# Patient Record
Sex: FEMALE | Race: WHITE
[De-identification: names, ages, dates, MRNs, and addresses within clinical notes are randomized per-mention and may not be internally consistent; named-entity substitution may affect disease eponyms.]

---

## 2019-04-18 ENCOUNTER — HOSPITAL ENCOUNTER (OUTPATIENT)
Dept: HOSPITAL 76 - LAB | Age: 29
Discharge: HOME | End: 2019-04-18
Attending: ADVANCED PRACTICE MIDWIFE
Payer: SELF-PAY

## 2019-04-18 DIAGNOSIS — O20.0: Primary | ICD-10-CM

## 2019-04-18 LAB
ERYTHROCYTE [DISTWIDTH] IN BLOOD BY AUTOMATED COUNT: 14 % (ref 12–15)
HGB UR QL STRIP: 14.3 G/DL (ref 12–16)
MCH RBC QN AUTO: 30.4 PG (ref 27–31)
MCHC RBC AUTO-ENTMCNC: 33.1 G/DL (ref 32–36)
MCV RBC AUTO: 91.8 FL (ref 81–99)
NEUTROPHILS # SNV AUTO: 6.5 X10^3/UL (ref 4.8–10.8)
PDW BLD AUTO: 8.1 FL (ref 7.9–10.8)
PLATELET # BLD: 307 10^3/UL (ref 130–450)
RBC MAR: 4.7 10^6/UL (ref 4.2–5.4)

## 2019-04-18 PROCEDURE — 86900 BLOOD TYPING SEROLOGIC ABO: CPT

## 2019-04-18 PROCEDURE — 86901 BLOOD TYPING SEROLOGIC RH(D): CPT

## 2019-04-18 PROCEDURE — 84702 CHORIONIC GONADOTROPIN TEST: CPT

## 2019-04-18 PROCEDURE — 36415 COLL VENOUS BLD VENIPUNCTURE: CPT

## 2019-04-18 PROCEDURE — 85027 COMPLETE CBC AUTOMATED: CPT

## 2019-04-19 ENCOUNTER — HOSPITAL ENCOUNTER (OUTPATIENT)
Dept: HOSPITAL 76 - DI | Age: 29
Discharge: HOME | End: 2019-04-19
Attending: ADVANCED PRACTICE MIDWIFE
Payer: SELF-PAY

## 2019-04-19 DIAGNOSIS — O26.31: ICD-10-CM

## 2019-04-19 DIAGNOSIS — Z32.01: Primary | ICD-10-CM

## 2019-04-19 PROCEDURE — 76817 TRANSVAGINAL US OBSTETRIC: CPT

## 2019-04-19 PROCEDURE — 76801 OB US < 14 WKS SINGLE FETUS: CPT

## 2019-04-19 NOTE — ULTRASOUND REPORT
Reason:  PREGNANCY TEST POSITIVE,INTRAUTERINE CONTRACEPTIVE

Procedure Date:  04/19/2019   

Accession Number:  [id number] / [id number]                    

Procedure:  US  - OB First Trimester CPT Code:  

 

FULL RESULT:

 

 

EXAM:

FIRST TRIMESTER OBSTETRIC ULTRASOUND

(Less than 11 weeks)

 

EXAM DATE: 4/19/2019 01:24 AM.

 

CLINICAL HISTORY: PREGNANCY TEST POSITIVE,INTRAUTERINE CONTRACEPTIVE.

LMP: 03/10/2019.

 

COMPARISONS: None.

 

TECHNIQUE: Transabdominal and transvaginal ultrasound examination with 

static image documentation.

 

CLINICAL DATES:

EGA 5 weeks 5 days with BINA 12/15/2019 based on LMP.

 

ASSESSMENT:

Gestational Sac: None visualized.

 

MATERNAL STRUCTURES:

Uterus: Anteverted. IUD is present in the endometrial canal. The 

endometrium measures 8 mm. No gestational sac is identified. The uterus 

measures 10.4 x 6.5 x 5.0 cm..

Cervix: Closed.

Right Ovary/Adnexa: The ovary measures 3.2 x 2.3 x 1.9 cm, volume 7 cc. 

Unremarkable.

Left Ovary/Adnexa: The ovary measures 3.0 x 2.5 x 1.3 cm, volume 5 cc.  

Unremarkable.

Free Fluid: Trace in the cul-de-sac..

 

Other: None.

IMPRESSION:

1. IUD present in the endometrial canal. No evidence of intrauterine or 

extrauterine gestation at this time. Trace free fluid in the cul-de-sac.

 

Recommend follow-up serial quantitative beta hCG, and rescan as 

clinically warranted.

 

ERVIN

 

The above call report findings  were discussed with Jose Hardin by 

Dr. José Jeffers at 02:30 AM on 4/19/2019.

## 2019-04-20 ENCOUNTER — HOSPITAL ENCOUNTER (OUTPATIENT)
Dept: HOSPITAL 76 - LAB | Age: 29
Discharge: HOME | End: 2019-04-20
Attending: OBSTETRICS & GYNECOLOGY
Payer: SELF-PAY

## 2019-04-20 DIAGNOSIS — O26.31: Primary | ICD-10-CM

## 2019-04-20 PROCEDURE — 36415 COLL VENOUS BLD VENIPUNCTURE: CPT

## 2019-04-20 PROCEDURE — 84702 CHORIONIC GONADOTROPIN TEST: CPT

## 2019-08-23 ENCOUNTER — HOSPITAL ENCOUNTER (OUTPATIENT)
Dept: HOSPITAL 76 - DI | Age: 29
Discharge: HOME | End: 2019-08-23
Attending: OBSTETRICS & GYNECOLOGY
Payer: COMMERCIAL

## 2019-08-23 ENCOUNTER — HOSPITAL ENCOUNTER (OUTPATIENT)
Dept: HOSPITAL 76 - LAB | Age: 29
Discharge: HOME | End: 2019-08-23
Attending: ADVANCED PRACTICE MIDWIFE
Payer: COMMERCIAL

## 2019-08-23 DIAGNOSIS — Z36.9: ICD-10-CM

## 2019-08-23 DIAGNOSIS — Z32.01: Primary | ICD-10-CM

## 2019-08-23 DIAGNOSIS — Z36.9: Primary | ICD-10-CM

## 2019-08-23 LAB
BASOPHILS NFR BLD AUTO: 0 10^3/UL (ref 0–0.1)
BASOPHILS NFR BLD AUTO: 0.3 %
CLARITY UR REFRACT.AUTO: CLEAR
EOSINOPHIL # BLD AUTO: 0.2 10^3/UL (ref 0–0.7)
EOSINOPHIL NFR BLD AUTO: 1.5 %
ERYTHROCYTE [DISTWIDTH] IN BLOOD BY AUTOMATED COUNT: 13.2 % (ref 12–15)
GLUCOSE UR QL STRIP.AUTO: NEGATIVE MG/DL
HGB UR QL STRIP: 14.3 G/DL (ref 12–16)
KETONES UR QL STRIP.AUTO: NEGATIVE MG/DL
LYMPHOCYTES # SPEC AUTO: 1.3 10^3/UL (ref 1.5–3.5)
LYMPHOCYTES NFR BLD AUTO: 11.2 %
MCH RBC QN AUTO: 31.4 PG (ref 27–31)
MCHC RBC AUTO-ENTMCNC: 34.5 G/DL (ref 32–36)
MCV RBC AUTO: 90.8 FL (ref 81–99)
MONOCYTES # BLD AUTO: 0.5 10^3/UL (ref 0–1)
MONOCYTES NFR BLD AUTO: 4.4 %
NEUTROPHILS # BLD AUTO: 9.3 10^3/UL (ref 1.5–6.6)
NEUTROPHILS # SNV AUTO: 11.3 X10^3/UL (ref 4.8–10.8)
NEUTROPHILS NFR BLD AUTO: 82.2 %
NITRITE UR QL STRIP.AUTO: NEGATIVE
PDW BLD AUTO: 9.6 FL (ref 7.9–10.8)
PH UR STRIP.AUTO: 6.5 PH (ref 5–7.5)
PLATELET # BLD: 287 10^3/UL (ref 130–450)
PROT UR STRIP.AUTO-MCNC: NEGATIVE MG/DL
RBC # UR STRIP.AUTO: NEGATIVE /UL
RBC # URNS HPF: (no result) /HPF (ref 0–5)
RBC MAR: 4.56 10^6/UL (ref 4.2–5.4)
SP GR UR STRIP.AUTO: 1.01 (ref 1–1.03)
SQUAMOUS URNS QL MICRO: (no result)
UROBILINOGEN UR QL STRIP.AUTO: (no result) E.U./DL
UROBILINOGEN UR STRIP.AUTO-MCNC: NEGATIVE MG/DL

## 2019-08-23 PROCEDURE — 81001 URINALYSIS AUTO W/SCOPE: CPT

## 2019-08-23 PROCEDURE — 86850 RBC ANTIBODY SCREEN: CPT

## 2019-08-23 PROCEDURE — 87389 HIV-1 AG W/HIV-1&-2 AB AG IA: CPT

## 2019-08-23 PROCEDURE — 86803 HEPATITIS C AB TEST: CPT

## 2019-08-23 PROCEDURE — 81599 UNLISTED MAAA: CPT

## 2019-08-23 PROCEDURE — 87340 HEPATITIS B SURFACE AG IA: CPT

## 2019-08-23 PROCEDURE — 86901 BLOOD TYPING SEROLOGIC RH(D): CPT

## 2019-08-23 PROCEDURE — 86762 RUBELLA ANTIBODY: CPT

## 2019-08-23 PROCEDURE — 36415 COLL VENOUS BLD VENIPUNCTURE: CPT

## 2019-08-23 PROCEDURE — 86900 BLOOD TYPING SEROLOGIC ABO: CPT

## 2019-08-23 PROCEDURE — 87086 URINE CULTURE/COLONY COUNT: CPT

## 2019-08-23 PROCEDURE — 76801 OB US < 14 WKS SINGLE FETUS: CPT

## 2019-08-23 PROCEDURE — 85025 COMPLETE CBC W/AUTO DIFF WBC: CPT

## 2019-08-25 NOTE — ULTRASOUND REPORT
Reason:  PREGNANCY TEST POSITIVE

Procedure Date:  08/23/2019   

Accession Number:  [id number] / [id number]                    

Procedure:  US  - OB First Trimester CPT Code:  

 

FULL RESULT:

 

 

EXAM:

LIMITED OBSTETRICAL ULTRASOUND

EARLY SECOND TRIMESTER

 

EXAM DATE: 8/23/2019 12:04 PM.

 

CLINICAL HISTORY: Pregnancy test positive.

 

COMPARISON: OB FIRST TRIMESTER 04/19/2019 1:23 AM.

 

TECHNIQUE: Real-time sonographic evaluation of the fetus performed by the 

sonographer. Multiple representative static images were saved for review.

 

DATING:

EGA 14 weeks 4 days with BINA 02/17/2020 based on LMP.

 

GENERAL EVALUATION

Phillips pregnancy.

Cardiac activity: 158 bpm.

Fetal movement: Visualized.

Presentation: Variable position.

Placenta: Posterior position. No evidence for previa.

Amniotic fluid: Subjectively normal. JORDEN 8.1 cm. MVP 2.8 cm.

 

FETAL BIOMETRY

Bi-Parietal Diameter (BPD): 2.61 cm, 14 weeks 4 days.

Head Circumference (HC): 9.8 cm, 14 weeks 4 days.

Abdominal Circumference (AC): 8.19 cm, 14 weeks 4 days.

Femur Length (FL): 1.47 cm, 14 weeks 2 days.

 

Estimated Fetal Weight: Out of range.

 

Estimated gestational age based on current ultrasound biometry, 14 weeks 

4 days with an BINA of 02/17/2020.

 

FETAL ANATOMY

Limited early fetal anatomy is unremarkable. Recommend returning to the 

clinic for a complete anatomic study.

 

MATERNAL STRUCTURES

Uterus: Unremarkable.

Cervix: Long and closed.

Right ovary/adnexa: Unremarkable.

Left ovary/adnexa: Unremarkable.

Free fluid: None.

IMPRESSION:

1. Phillips live intrauterine pregnancy with gestational age 14 weeks 4 

days based on fetal biometry, this is concordant with dates based upon 

LMP.

2. Fetal size is within expected limits for assigned dating.

3. No fetal anatomic abnormalities seen on this early second trimester 

ultrasound. Recommend returning to the clinic for a definitive complete 

anatomic survey.

4. Amniotic fluid index of 8.1 cm.

 

Note: Detailed anatomic survey at 18-22 weeks is recommended for all 

fetuses evaluated prior to 18 weeks, as some structural abnormalities may 

be inapparent at earlier gestational ages.

 

RADIA

## 2019-08-26 LAB
HEPATITIS B SURFACE ANTIGEN: (no result)
HEPATITIS C ANTIBODY: (no result)
HIV AG/AB 4TH GEN: (no result)
SIGNAL TO CUT-OFF: 0 (ref ?–1)

## 2019-09-19 ENCOUNTER — HOSPITAL ENCOUNTER (OUTPATIENT)
Dept: HOSPITAL 76 - LAB.R | Age: 29
Discharge: HOME | End: 2019-09-19
Attending: OBSTETRICS & GYNECOLOGY
Payer: COMMERCIAL

## 2019-09-19 DIAGNOSIS — Z34.90: Primary | ICD-10-CM

## 2019-09-19 LAB
AMPHET UR QL SCN: NEGATIVE
BENZODIAZ UR QL SCN: NEGATIVE
COCAINE UR-SCNC: NEGATIVE UMOL/L
METHADONE UR QL SCN: NEGATIVE
METHAMPHET UR QL SCN: NEGATIVE
OPIATES UR QL SCN: NEGATIVE
T VAGINALIS RRNA GENITAL QL PROBE: NEGATIVE
VOLATILE DRUGS POS SERPL SCN: (no result)

## 2019-09-19 PROCEDURE — 80306 DRUG TEST PRSMV INSTRMNT: CPT

## 2019-09-19 PROCEDURE — 87491 CHLMYD TRACH DNA AMP PROBE: CPT

## 2019-09-19 PROCEDURE — 87661 TRICHOMONAS VAGINALIS AMPLIF: CPT

## 2019-09-19 PROCEDURE — 87591 N.GONORRHOEAE DNA AMP PROB: CPT

## 2019-11-25 ENCOUNTER — HOSPITAL ENCOUNTER (OUTPATIENT)
Dept: HOSPITAL 76 - LAB.WCP | Age: 29
Discharge: HOME | End: 2019-11-25
Attending: ADVANCED PRACTICE MIDWIFE
Payer: COMMERCIAL

## 2019-11-25 DIAGNOSIS — Z36.89: Primary | ICD-10-CM

## 2019-11-25 LAB
ERYTHROCYTE [DISTWIDTH] IN BLOOD BY AUTOMATED COUNT: 13.2 % (ref 12–15)
HGB UR QL STRIP: 12.6 G/DL (ref 12–16)
MCH RBC QN AUTO: 31.3 PG (ref 27–31)
MCHC RBC AUTO-ENTMCNC: 31.5 G/DL (ref 32–36)
MCV RBC AUTO: 99.3 FL (ref 81–99)
NEUTROPHILS # SNV AUTO: 11.2 X10^3/UL (ref 4.8–10.8)
PDW BLD AUTO: 9.9 FL (ref 7.9–10.8)
PLATELET # BLD: 322 10^3/UL (ref 130–450)
RBC MAR: 4.03 10^6/UL (ref 4.2–5.4)

## 2019-11-25 PROCEDURE — 82950 GLUCOSE TEST: CPT

## 2019-11-25 PROCEDURE — 86850 RBC ANTIBODY SCREEN: CPT

## 2019-11-25 PROCEDURE — 36415 COLL VENOUS BLD VENIPUNCTURE: CPT

## 2019-11-25 PROCEDURE — 85027 COMPLETE CBC AUTOMATED: CPT

## 2019-12-26 ENCOUNTER — HOSPITAL ENCOUNTER (OUTPATIENT)
Dept: HOSPITAL 76 - WFO | Age: 29
Discharge: ANOTHER HOSPITAL | End: 2019-12-26
Attending: OBSTETRICS & GYNECOLOGY
Payer: COMMERCIAL

## 2019-12-26 VITALS — DIASTOLIC BLOOD PRESSURE: 76 MMHG | SYSTOLIC BLOOD PRESSURE: 124 MMHG

## 2019-12-26 DIAGNOSIS — Z3A.32: ICD-10-CM

## 2019-12-26 DIAGNOSIS — O46.93: Primary | ICD-10-CM

## 2019-12-26 LAB
ALBUMIN DIAFP-MCNC: 3.5 G/DL (ref 3.2–5.5)
ALBUMIN/GLOB SERPL: 1.2 {RATIO} (ref 1–2.2)
ALP SERPL-CCNC: 72 IU/L (ref 42–121)
ALT SERPL W P-5'-P-CCNC: 14 IU/L (ref 10–60)
ANION GAP SERPL CALCULATED.4IONS-SCNC: 10 MMOL/L (ref 6–13)
APTT PPP: 27.3 SECS (ref 24.9–33.3)
AST SERPL W P-5'-P-CCNC: 19 IU/L (ref 10–42)
BASOPHILS NFR BLD AUTO: 0 10^3/UL (ref 0–0.1)
BASOPHILS NFR BLD AUTO: 0.3 %
BILIRUB BLD-MCNC: 0.2 MG/DL (ref 0.2–1)
BUN SERPL-MCNC: 11 MG/DL (ref 6–20)
CALCIUM UR-MCNC: 9 MG/DL (ref 8.5–10.3)
CHLORIDE SERPL-SCNC: 105 MMOL/L (ref 101–111)
CO2 SERPL-SCNC: 22 MMOL/L (ref 21–32)
CREAT SERPLBLD-SCNC: 0.6 MG/DL (ref 0.4–1)
EOSINOPHIL # BLD AUTO: 0.2 10^3/UL (ref 0–0.7)
EOSINOPHIL NFR BLD AUTO: 1.4 %
ERYTHROCYTE [DISTWIDTH] IN BLOOD BY AUTOMATED COUNT: 12.7 % (ref 12–15)
FIBRINOGEN PPP-MCNC: 436 MG/DL (ref 220–496)
GFRSERPLBLD MDRD-ARVRAT: 117 ML/MIN/{1.73_M2} (ref 89–?)
GLOBULIN SER-MCNC: 3 G/DL (ref 2.1–4.2)
GLUCOSE SERPL-MCNC: 98 MG/DL (ref 70–100)
HGB UR QL STRIP: 12.3 G/DL (ref 12–16)
INR PPP: 1 (ref 0.8–1.2)
LYMPHOCYTES # SPEC AUTO: 1.2 10^3/UL (ref 1.5–3.5)
LYMPHOCYTES NFR BLD AUTO: 9.3 %
MCH RBC QN AUTO: 32.1 PG (ref 27–31)
MCHC RBC AUTO-ENTMCNC: 34.1 G/DL (ref 32–36)
MCV RBC AUTO: 94.3 FL (ref 81–99)
MONOCYTES # BLD AUTO: 0.8 10^3/UL (ref 0–1)
MONOCYTES NFR BLD AUTO: 6.1 %
NEUTROPHILS # BLD AUTO: 10.2 10^3/UL (ref 1.5–6.6)
NEUTROPHILS # SNV AUTO: 12.4 X10^3/UL (ref 4.8–10.8)
NEUTROPHILS NFR BLD AUTO: 81.9 %
PDW BLD AUTO: 9.9 FL (ref 7.9–10.8)
PLATELET # BLD: 283 10^3/UL (ref 130–450)
PROT SPEC-MCNC: 6.5 G/DL (ref 6.7–8.2)
PROTHROM ACT/NOR PPP: 11.9 SECS (ref 9.9–12.6)
RBC MAR: 3.83 10^6/UL (ref 4.2–5.4)
SODIUM SERPLBLD-SCNC: 137 MMOL/L (ref 135–145)
T VAGINALIS RRNA GENITAL QL PROBE: NEGATIVE

## 2019-12-26 PROCEDURE — 87081 CULTURE SCREEN ONLY: CPT

## 2019-12-26 PROCEDURE — 96372 THER/PROPH/DIAG INJ SC/IM: CPT

## 2019-12-26 PROCEDURE — 81599 UNLISTED MAAA: CPT

## 2019-12-26 PROCEDURE — 86900 BLOOD TYPING SEROLOGIC ABO: CPT

## 2019-12-26 PROCEDURE — 85730 THROMBOPLASTIN TIME PARTIAL: CPT

## 2019-12-26 PROCEDURE — 99214 OFFICE O/P EST MOD 30 MIN: CPT

## 2019-12-26 PROCEDURE — 85610 PROTHROMBIN TIME: CPT

## 2019-12-26 PROCEDURE — 85025 COMPLETE CBC W/AUTO DIFF WBC: CPT

## 2019-12-26 PROCEDURE — 85384 FIBRINOGEN ACTIVITY: CPT

## 2019-12-26 PROCEDURE — 87797 DETECT AGENT NOS DNA DIR: CPT

## 2019-12-26 PROCEDURE — 87491 CHLMYD TRACH DNA AMP PROBE: CPT

## 2019-12-26 PROCEDURE — 86901 BLOOD TYPING SEROLOGIC RH(D): CPT

## 2019-12-26 PROCEDURE — 76815 OB US LIMITED FETUS(S): CPT

## 2019-12-26 PROCEDURE — 87591 N.GONORRHOEAE DNA AMP PROB: CPT

## 2019-12-26 PROCEDURE — 86850 RBC ANTIBODY SCREEN: CPT

## 2019-12-26 PROCEDURE — 80053 COMPREHEN METABOLIC PANEL: CPT

## 2019-12-26 PROCEDURE — 87661 TRICHOMONAS VAGINALIS AMPLIF: CPT

## 2019-12-26 PROCEDURE — 36415 COLL VENOUS BLD VENIPUNCTURE: CPT

## 2019-12-26 NOTE — ULTRASOUND REPORT
Reason:  vaginal bleeding, MD at bedside

Procedure Date:  12/26/2019   

Accession Number:  [id number] / [id number]                    

Procedure:  US  - OB Limited CPT Code:  

 

***Final Report***

 

 

FULL RESULT:

 

 

EXAM:

LIMITED OBSTETRICAL ULTRASOUND

 

EXAM DATE: 12/26/2019 04:25 PM.

 

CLINICAL HISTORY: Vaginal bleeding, MD at bedside.

 

COMPARISON: OB DETAILED FETAL EVAL 09/27/2019 2:47 PM. 08/23/2019 first 

trimester ultrasound.

 

TECHNIQUE: Real-time sonographic evaluation of the fetus performed by the 

sonographer. Multiple representative static images were saved for review. 

Additional transvaginal imaging to more accurately evaluate cervical 

length/placental position/etc.

 

DATING:

Established EGA 32 weeks 3 days with BINA February 17, 2020. Based on 

first trimester ultrasound which was concordant with dates from LMP.

 

GENERAL EVALUATION

Phillips pregnancy.

Cardiac activity: 153 bpm.

Fetal movement: Visualized.

Presentation: Transverse maternal left

Placenta: Posterior and fundal. Near but separate from the main placenta 

are 2 small masses along the uterus wall with similar echogenicity as the 

placenta. May be succenturiate lobe(s). There is some vascularity present.

Amniotic fluid: Volume appears normal, but not measured. Dependent, in 

the lower uterine segment, between the placenta and cervix, there is a 

small amount of slightly echogenic fluid.

IMPRESSION:

1. Phillips live intrauterine pregnancy with gestational age 32 weeks 

and 3 days based on established BINA of 02/17/2020.

2. No evidence of placenta previa as the placenta is posterior and 

fundal, as was seen on the anatomic survey. 2 small masses along the 

uterine wall which appear separate from the main placenta may be 

succenturiate lobes.

3. A small amount of slightly echogenic dependent fluid in the lower 

uterine segment. The cervix measured 4.1 cm and was closed.

 

RADIA

## 2019-12-26 NOTE — HISTORY & PHYSICAL EXAMINATION
History of Present Illness





- History of Present Illness


HPI Comment/Other: 





CC:  bleeding


HPI:  Had sex yesterday.  A bit of brown spotting a few hours afterward but only

for a bit.  Then abruptly at 14:30 had a "gush" of "lots" of blood, felt like 

real flow--like a moderate menses.  Went to the toilet and had flow into the 

bowl.  Put a diva cup in.  Has gone through 2 diva cups at home.  Called the 

clinic and was advised to come in.  No contractions but is having a sensation of

lower abdominal cramping.  No leaking of fluid.  Good FM earlier today but in 

the past hour has not felt much.  





PMH:  neg


PSH:  wisdom teeth


FH:  HTN and CVA


SH:  no t/e/d


Allergies:  nystatin


Meds:  PNV


OB:  .  3 term vaginal deliveries with uncomplicated pregnancies.  Early 

SAB 2019 with IUD in situ.  IUD was removed following the loss.  Uncomplicated

pregnancy this time.  


BINA 19 by LMP c/w 14w US.  LMP .  US 19 14w4d --> 19.  


Anatomy scan normal except for "possible" choroid plexus cyst.  EFW 48%ile, 

posterior placenta, normal fluid.  No previa on the 14w US either. 


O+, normal 1h, most recent Hct 40 on 


s/p Tdap and flu vax





O:  124/76, HR 99


Appears nervous but well.  NAD.  


Abd soft, nontender, gravid


Uterus nontender


External female genitalia, vagina, and cervix are normal.  Pooled maroon blood 

present in the fornix.  No active cervical bleeding.  


No LE edema


Bedside US:  Breech, normal fluid, no previa.  Anterior subchorionic hemorrhage 

measuring 7.7 x 1.4 x 2.3cm


Menstrual pad 1/3 saturated after 30min


Category 1 NST


[place] Neg





A/P:  28yo  at 32w4d by LMP c/w 14w US with active subchorinonic 

hemorrhage, is bleeding steadily but not too heavily for transport.  Hemod

ynamically stable.  No evidence of previa.  No trauma history or abnormal heart 

tones to suggest abruption.  


--BMZ 12mg IM given


--All labs pending including T&S, CBC, CMP, coags, fibrinogen, and K-B.  


--GBS PCR and culture sent


--GC/CT sent.  No evidence of vaginitis on exam.  


--Fetal wellbeing is reassuring.  Breech.  Category 1 tracing.  


--Otherwise uncomplicated medical and obstetric history.  


--Not currently in labor.  Will transport to NICU capable facility due to 

gestational age and increased risk of  delivery and PPROM.  Discussed 

with  [facility].  Transport accepted by Dr. Abel OB at 

[facility].   





Exam





- Vital Signs


Vital Signs: 





                                Vital Signs x48h











  Temp Pulse Resp BP Pulse Ox


 


 19 15:45  98.1 F  112 H  18  124/76  99














Conclusion/Plan





- Lab Results


Fish Bones: 


                                 19 16:04





                                 19 16:04